# Patient Record
Sex: FEMALE | Race: WHITE | NOT HISPANIC OR LATINO | URBAN - METROPOLITAN AREA
[De-identification: names, ages, dates, MRNs, and addresses within clinical notes are randomized per-mention and may not be internally consistent; named-entity substitution may affect disease eponyms.]

---

## 2023-04-10 ENCOUNTER — NURSING HOME VISIT (OUTPATIENT)
Dept: POST ACUTE CARE | Facility: EXTERNAL LOCATION | Age: 88
End: 2023-04-10
Payer: MEDICARE

## 2023-04-10 DIAGNOSIS — E87.6 HYPOKALEMIA: ICD-10-CM

## 2023-04-10 DIAGNOSIS — J30.9 ALLERGIC RHINITIS, UNSPECIFIED SEASONALITY, UNSPECIFIED TRIGGER: ICD-10-CM

## 2023-04-10 DIAGNOSIS — L98.429 SKIN ULCER OF SACRUM, UNSPECIFIED ULCER STAGE (MULTI): ICD-10-CM

## 2023-04-10 DIAGNOSIS — F41.9 ANXIETY: ICD-10-CM

## 2023-04-10 DIAGNOSIS — F51.01 PRIMARY INSOMNIA: ICD-10-CM

## 2023-04-10 DIAGNOSIS — U07.1 COVID-19: Primary | ICD-10-CM

## 2023-04-10 DIAGNOSIS — I10 PRIMARY HYPERTENSION: ICD-10-CM

## 2023-04-10 DIAGNOSIS — D50.9 IRON DEFICIENCY ANEMIA, UNSPECIFIED IRON DEFICIENCY ANEMIA TYPE: ICD-10-CM

## 2023-04-10 DIAGNOSIS — R53.81 PHYSICAL DECONDITIONING: ICD-10-CM

## 2023-04-10 DIAGNOSIS — K21.9 GASTROESOPHAGEAL REFLUX DISEASE WITHOUT ESOPHAGITIS: ICD-10-CM

## 2023-04-10 PROCEDURE — 99309 SBSQ NF CARE MODERATE MDM 30: CPT | Performed by: NURSE PRACTITIONER

## 2023-04-10 NOTE — LETTER
Patient: Dixie Hansen  : 1935    Encounter Date: 04/10/2023    Patient ID: Dixie Hansen is a 88 y.o. female who is acute skilled care being seen and evaluated for multiple medical problems.  [id number]   1935    /80   Pulse 86   Temp 36.7 °C (98 °F)   Resp 17   Wt 73 kg (161 lb)   SpO2 97%     Assessment/Plan  Problem List Items Addressed This Visit          Nervous    Primary insomnia     Melatonin 5 mg by mouth every HS as needed            Circulatory    Primary hypertension     Atenolol 25 mg by mouth daily             Digestive    Gastroesophageal reflux disease without esophagitis     Pepcid 20 mg by mouth daily             Hematologic    Iron deficiency anemia     Ferrous sulfate 325 mg by mouth daily   Folic acid 1 mg by mouth daily (Anemia)             Infectious/Inflammatory    COVID-19 - Primary     Afebrile  Denies sore throat, cough, or ear pain. C/O post nasal drip            Other    Anxiety     Add Vistaril 25 mg by mouth every 6 hrs as needed          Hypokalemia     Potassium 2023- 3.5  Potasssium 10 meq by mouth daily          Allergic rhinitis     Loratadine 10 mg by mouth daily          Skin ulcer of sacrum (CMS/HCC)     Doxycycline 100 mg by mouth BID (Stop date 2023)  Augmentin 500/125 mg by mouth BID (Stop date 2023)   Add Probiotic two capsules by mouth daily x 3 weeks  Wound care as ordered   Acetaminophen 650 mg by mouth every 4 hrs as needed    Tramadol 50 mg by mouth every 8 hrs as needd         Physical deconditioning     PT/OT              HPI: Client was admitted at [facility] from 3/28/2023- 2023. Admitted with visual hallucinations and change in mentation. Final diagnosis of infected sacral decubitus and COVID Positive. Dr Salgado consulted (ID) and client received Vancomycin and Zosyn. BC positive for coag- negative staph, thought to be contaminated. Wound culture positive for MRSA, Ecoli, and Proteus. Client discharged with oral Augmentin  and Doxycycline. PMH includes psoriatic arthritis (Dr Mcintyre),  HTN, HLD, and a carotid endarterectomy at [place] in 2003. Client denies HA, dizziness, or lightheadedness. Denies CP, SOB, Cough, or increased edema. RA. Denies abdominal pain, N/V,  or constipation. C/O loose stools. Denies dysuria. States appetite fair. Denies insomnia. C/O lumbar pain.     Review of Systems: ROS as described in in HPI     Physical Exam  Constitutional:       Appearance: Normal appearance.      Comments: Resting in bed   HENT:      Head: Normocephalic.      Mouth/Throat:      Mouth: Mucous membranes are moist.   Cardiovascular:      Rate and Rhythm: Normal rate.   Pulmonary:      Effort: Pulmonary effort is normal.      Breath sounds: Normal breath sounds.      Comments: RA  Abdominal:      General: Bowel sounds are normal.   Genitourinary:     Comments: Denies dysuria   Musculoskeletal:      Cervical back: Neck supple.      Comments: Physical deconditioning  PT/OT   Skin:     General: Skin is warm and dry.      Comments: Sacral decubitus  Prafo boots     Neurological:      Mental Status: She is alert. Mental status is at baseline.   Psychiatric:         Mood and Affect: Mood normal.      Comments: Staff report client has episodes of heightened anxiety                    Electronically Signed By: SORAYA Barraza-CNP   4/11/23  4:14 PM

## 2023-04-11 VITALS
DIASTOLIC BLOOD PRESSURE: 80 MMHG | HEART RATE: 86 BPM | RESPIRATION RATE: 17 BRPM | OXYGEN SATURATION: 97 % | WEIGHT: 161 LBS | SYSTOLIC BLOOD PRESSURE: 128 MMHG | TEMPERATURE: 98 F

## 2023-04-11 PROBLEM — E87.6 HYPOKALEMIA: Status: ACTIVE | Noted: 2023-04-11

## 2023-04-11 PROBLEM — J30.9 ALLERGIC RHINITIS: Status: ACTIVE | Noted: 2023-04-11

## 2023-04-11 PROBLEM — I10 PRIMARY HYPERTENSION: Status: ACTIVE | Noted: 2023-04-11

## 2023-04-11 PROBLEM — F51.01 PRIMARY INSOMNIA: Status: ACTIVE | Noted: 2023-04-11

## 2023-04-11 PROBLEM — R53.81 PHYSICAL DECONDITIONING: Status: ACTIVE | Noted: 2023-04-11

## 2023-04-11 PROBLEM — F41.9 ANXIETY: Status: ACTIVE | Noted: 2023-04-11

## 2023-04-11 PROBLEM — K21.9 GASTROESOPHAGEAL REFLUX DISEASE WITHOUT ESOPHAGITIS: Status: ACTIVE | Noted: 2023-04-11

## 2023-04-11 PROBLEM — L98.429 SKIN ULCER OF SACRUM (MULTI): Status: ACTIVE | Noted: 2023-04-11

## 2023-04-11 PROBLEM — U07.1 COVID-19: Status: ACTIVE | Noted: 2023-04-11

## 2023-04-11 PROBLEM — D50.9 IRON DEFICIENCY ANEMIA: Status: ACTIVE | Noted: 2023-04-11

## 2023-04-11 NOTE — PROGRESS NOTES
Patient ID: Dixie Hansen is a 88 y.o. female who is acute skilled care being seen and evaluated for multiple medical problems.  [id number]   1935    /80   Pulse 86   Temp 36.7 °C (98 °F)   Resp 17   Wt 73 kg (161 lb)   SpO2 97%     Assessment/Plan  Problem List Items Addressed This Visit          Nervous    Primary insomnia     Melatonin 5 mg by mouth every HS as needed            Circulatory    Primary hypertension     Atenolol 25 mg by mouth daily             Digestive    Gastroesophageal reflux disease without esophagitis     Pepcid 20 mg by mouth daily             Hematologic    Iron deficiency anemia     Ferrous sulfate 325 mg by mouth daily   Folic acid 1 mg by mouth daily (Anemia)             Infectious/Inflammatory    COVID-19 - Primary     Afebrile  Denies sore throat, cough, or ear pain. C/O post nasal drip            Other    Anxiety     Add Vistaril 25 mg by mouth every 6 hrs as needed          Hypokalemia     Potassium 4/6/2023- 3.5  Potasssium 10 meq by mouth daily          Allergic rhinitis     Loratadine 10 mg by mouth daily          Skin ulcer of sacrum (CMS/HCC)     Doxycycline 100 mg by mouth BID (Stop date 4/14/2023)  Augmentin 500/125 mg by mouth BID (Stop date 4/14/2023)   Add Probiotic two capsules by mouth daily x 3 weeks  Wound care as ordered   Acetaminophen 650 mg by mouth every 4 hrs as needed    Tramadol 50 mg by mouth every 8 hrs as needd         Physical deconditioning     PT/OT              HPI: Client was admitted at [facility] from 3/28/2023- 4/4/2023. Admitted with visual hallucinations and change in mentation. Final diagnosis of infected sacral decubitus and COVID Positive. Dr Salgado consulted (ID) and client received Vancomycin and Zosyn. BC positive for coag- negative staph, thought to be contaminated. Wound culture positive for MRSA, Ecoli, and Proteus. Client discharged with oral Augmentin and Doxycycline. PMH includes psoriatic arthritis (Dr Mcintyre),  HTN,  HLAL, and a carotid endarterectomy at [place] in 2003. Client denies HA, dizziness, or lightheadedness. Denies CP, SOB, Cough, or increased edema. RA. Denies abdominal pain, N/V,  or constipation. C/O loose stools. Denies dysuria. States appetite fair. Denies insomnia. C/O lumbar pain.     Review of Systems: ROS as described in in HPI     Physical Exam  Constitutional:       Appearance: Normal appearance.      Comments: Resting in bed   HENT:      Head: Normocephalic.      Mouth/Throat:      Mouth: Mucous membranes are moist.   Cardiovascular:      Rate and Rhythm: Normal rate.   Pulmonary:      Effort: Pulmonary effort is normal.      Breath sounds: Normal breath sounds.      Comments: RA  Abdominal:      General: Bowel sounds are normal.   Genitourinary:     Comments: Denies dysuria   Musculoskeletal:      Cervical back: Neck supple.      Comments: Physical deconditioning  PT/OT   Skin:     General: Skin is warm and dry.      Comments: Sacral decubitus  Prafo boots     Neurological:      Mental Status: She is alert. Mental status is at baseline.   Psychiatric:         Mood and Affect: Mood normal.      Comments: Staff report client has episodes of heightened anxiety

## 2023-04-11 NOTE — ASSESSMENT & PLAN NOTE
Ferrous sulfate 325 mg by mouth daily   Folic acid 1 mg by mouth daily (Anemia)    All other review of systems negative, except as noted in HPI

## 2023-04-11 NOTE — ASSESSMENT & PLAN NOTE
Doxycycline 100 mg by mouth BID (Stop date 4/14/2023)  Augmentin 500/125 mg by mouth BID (Stop date 4/14/2023)   Add Probiotic two capsules by mouth daily x 3 weeks  Wound care as ordered   Acetaminophen 650 mg by mouth every 4 hrs as needed    Tramadol 50 mg by mouth every 8 hrs as needd

## 2023-04-17 ENCOUNTER — NURSING HOME VISIT (OUTPATIENT)
Dept: POST ACUTE CARE | Facility: EXTERNAL LOCATION | Age: 88
End: 2023-04-17
Payer: MEDICARE

## 2023-04-17 DIAGNOSIS — L98.429 SKIN ULCER OF SACRUM, UNSPECIFIED ULCER STAGE (MULTI): ICD-10-CM

## 2023-04-17 DIAGNOSIS — U07.1 COVID-19: ICD-10-CM

## 2023-04-17 DIAGNOSIS — I10 PRIMARY HYPERTENSION: ICD-10-CM

## 2023-04-17 DIAGNOSIS — R53.81 PHYSICAL DECONDITIONING: Primary | ICD-10-CM

## 2023-04-17 PROCEDURE — 99308 SBSQ NF CARE LOW MDM 20: CPT | Performed by: NURSE PRACTITIONER

## 2023-04-17 NOTE — LETTER
Patient: Dixie Hansen  : 1935    Encounter Date: 2023    Patient ID: Dixie Hansen is a 88 y.o. female who is acute skilled care being seen and evaluated for multiple medical problems.  [id number]   1935    /77   Pulse 76   Temp 36.1 °C (97 °F)   Resp 17   Wt 73 kg (161 lb)   SpO2 98%     Assessment/Plan  Problem List Items Addressed This Visit          Circulatory    Primary hypertension     Atenolol 25 mg by mouth daily              Infectious/Inflammatory    COVID-19     COVID isolation discontinued   Afebrile  Denies cough                Other    Skin ulcer of sacrum (CMS/HCC)     Doxycycline 100 mg by mouth BID (Completed 2023)  Augmentin 500/125 mg by mouth BID (Completed 2023)   Probiotic two capsules by mouth daily x 3 weeks  Wound care as ordered   Acetaminophen 650 mg by mouth every 4 hrs as needed    Tramadol 50 mg by mouth every 8 hrs as needd         Physical deconditioning - Primary     PT/OT              HPI: Client continues to receive PT/OT services, physical deconditioning. Client denies HA, dizziness, or lightheadedness. Denies CP, SOB, Cough, or increased edema. RA. Denies abdominal pain, N/V, diarrhea, or constipation. Denies dysuria (Pérez). Denies change in appetite. Denies insomnia. Denies current pain.      Review of Systems ROS as described in in HPI     Physical Exam  Constitutional:       Comments: Resting in bed    HENT:      Head: Normocephalic.      Mouth/Throat:      Mouth: Mucous membranes are moist.   Cardiovascular:      Rate and Rhythm: Normal rate.   Pulmonary:      Effort: Pulmonary effort is normal.      Breath sounds: Normal breath sounds.      Comments: RA  Abdominal:      General: Bowel sounds are normal.   Genitourinary:     Comments: Pérez with yellow urine   Musculoskeletal:      Cervical back: Neck supple.      Comments: Physical deconditioning  PT/OT  Bilateral heal protectors    Skin:     General: Skin is warm and dry.   Neurological:       Mental Status: She is alert. Mental status is at baseline.   Psychiatric:         Mood and Affect: Mood normal.      Comments: Cooperative with no current heightened anxiety                    Electronically Signed By: DESTINY Barraza   4/18/23  4:20 PM

## 2023-04-18 VITALS
RESPIRATION RATE: 17 BRPM | TEMPERATURE: 97 F | OXYGEN SATURATION: 98 % | WEIGHT: 161 LBS | DIASTOLIC BLOOD PRESSURE: 77 MMHG | SYSTOLIC BLOOD PRESSURE: 124 MMHG | HEART RATE: 76 BPM

## 2023-04-18 NOTE — PROGRESS NOTES
Patient ID: Dixie Hansen is a 88 y.o. female who is acute skilled care being seen and evaluated for multiple medical problems.  [id number]   1935    /77   Pulse 76   Temp 36.1 °C (97 °F)   Resp 17   Wt 73 kg (161 lb)   SpO2 98%     Assessment/Plan  Problem List Items Addressed This Visit          Circulatory    Primary hypertension     Atenolol 25 mg by mouth daily              Infectious/Inflammatory    COVID-19     COVID isolation discontinued   Afebrile  Denies cough                Other    Skin ulcer of sacrum (CMS/HCC)     Doxycycline 100 mg by mouth BID (Completed 4/14/2023)  Augmentin 500/125 mg by mouth BID (Completed 4/14/2023)   Probiotic two capsules by mouth daily x 3 weeks  Wound care as ordered   Acetaminophen 650 mg by mouth every 4 hrs as needed    Tramadol 50 mg by mouth every 8 hrs as needd         Physical deconditioning - Primary     PT/OT              HPI: Client continues to receive PT/OT services, physical deconditioning. Client denies HA, dizziness, or lightheadedness. Denies CP, SOB, Cough, or increased edema. RA. Denies abdominal pain, N/V, diarrhea, or constipation. Denies dysuria (Pérez). Denies change in appetite. Denies insomnia. Denies current pain.      Review of Systems ROS as described in in HPI     Physical Exam  Constitutional:       Comments: Resting in bed    HENT:      Head: Normocephalic.      Mouth/Throat:      Mouth: Mucous membranes are moist.   Cardiovascular:      Rate and Rhythm: Normal rate.   Pulmonary:      Effort: Pulmonary effort is normal.      Breath sounds: Normal breath sounds.      Comments: RA  Abdominal:      General: Bowel sounds are normal.   Genitourinary:     Comments: Pérez with yellow urine   Musculoskeletal:      Cervical back: Neck supple.      Comments: Physical deconditioning  PT/OT  Bilateral heal protectors    Skin:     General: Skin is warm and dry.   Neurological:      Mental Status: She is alert. Mental status is at baseline.    Psychiatric:         Mood and Affect: Mood normal.      Comments: Cooperative with no current heightened anxiety

## 2023-04-18 NOTE — ASSESSMENT & PLAN NOTE
Doxycycline 100 mg by mouth BID (Completed 4/14/2023)  Augmentin 500/125 mg by mouth BID (Completed 4/14/2023)   Probiotic two capsules by mouth daily x 3 weeks  Wound care as ordered   Acetaminophen 650 mg by mouth every 4 hrs as needed    Tramadol 50 mg by mouth every 8 hrs as needd

## 2023-05-02 ENCOUNTER — NURSING HOME VISIT (OUTPATIENT)
Dept: POST ACUTE CARE | Facility: EXTERNAL LOCATION | Age: 88
End: 2023-05-02
Payer: MEDICARE

## 2023-05-02 DIAGNOSIS — I10 PRIMARY HYPERTENSION: ICD-10-CM

## 2023-05-02 DIAGNOSIS — E87.6 HYPOKALEMIA: ICD-10-CM

## 2023-05-02 DIAGNOSIS — R53.81 PHYSICAL DECONDITIONING: Primary | ICD-10-CM

## 2023-05-02 DIAGNOSIS — L98.429 SKIN ULCER OF SACRUM, UNSPECIFIED ULCER STAGE (MULTI): ICD-10-CM

## 2023-05-02 DIAGNOSIS — I49.9 IRREGULAR HEART RATE: ICD-10-CM

## 2023-05-02 PROCEDURE — 99308 SBSQ NF CARE LOW MDM 20: CPT | Performed by: NURSE PRACTITIONER

## 2023-05-02 NOTE — LETTER
Patient: Dixie Hansen  : 1935    Encounter Date: 2023    Patient ID: Dixie Hansen is a 88 y.o. female who is acute skilled care being seen and evaluated for multiple medical problems.  [id number]   1935    /81   Pulse 78   Temp 36.7 °C (98 °F)   Resp 17   Wt 73 kg (161 lb)   SpO2 96%     Assessment/Plan  Problem List Items Addressed This Visit          Circulatory    Primary hypertension     Atenolol 25 mg by mouth daily           Irregular heart rate     EKG  BMP  CBC with diff  TSH            Other    Hypokalemia     Potassium 2023- 3.5  Potasssium 10 meq by mouth daily            Skin ulcer of sacrum (CMS/HCC)     Doxycycline 100 mg by mouth BID (Completed 2023)  Augmentin 500/125 mg by mouth BID (Completed 2023)   Wound care as ordered   Acetaminophen 650 mg by mouth every 4 hrs as needed    Tramadol 50 mg by mouth every 8 hrs as needed         Physical deconditioning - Primary     PT/OT              HPI: Client continues to receive PT services, physical deconditioning. Client denies HA, dizziness, or lightheadedness. Denies CP, SOB, Cough, or increased edema. RA. Denies abdominal pain, N/V, or constipation. States she had 3 episodes of diarrhea yesterday. Denies dysuria. States appetite decreased today. Denies insomnia. C/O buttock pain.      Review of Systems ROS as described in in HPI     Physical Exam  Constitutional:       Appearance: Normal appearance.      Comments: Sitting in WC   HENT:      Head: Normocephalic.      Mouth/Throat:      Mouth: Mucous membranes are moist.   Cardiovascular:      Rate and Rhythm: Normal rate. Rhythm irregular.   Pulmonary:      Effort: Pulmonary effort is normal.      Breath sounds: Normal breath sounds.      Comments: RA  Abdominal:      General: Bowel sounds are normal.   Genitourinary:     Comments: Denies dysuria   Musculoskeletal:      Cervical back: Neck supple.      Comments: PT/OT  Physical deconditioning    Skin:     General:  Skin is warm and dry.      Comments: Sacral wound care as ordered   Neurological:      Mental Status: She is alert. Mental status is at baseline.   Psychiatric:         Mood and Affect: Mood normal.      Comments: Staff report client has episodes of heightened anxiety                    Electronically Signed By: DESTINY Barraza   5/3/23  8:36 AM

## 2023-05-03 VITALS
TEMPERATURE: 98 F | HEART RATE: 78 BPM | RESPIRATION RATE: 17 BRPM | WEIGHT: 161 LBS | SYSTOLIC BLOOD PRESSURE: 142 MMHG | DIASTOLIC BLOOD PRESSURE: 81 MMHG | OXYGEN SATURATION: 96 %

## 2023-05-03 PROBLEM — I49.9 IRREGULAR HEART RATE: Status: ACTIVE | Noted: 2023-05-03

## 2023-05-03 NOTE — PROGRESS NOTES
Patient ID: Dixie Hansen is a 88 y.o. female who is acute skilled care being seen and evaluated for multiple medical problems.  [id number]   1935    /81   Pulse 78   Temp 36.7 °C (98 °F)   Resp 17   Wt 73 kg (161 lb)   SpO2 96%     Assessment/Plan  Problem List Items Addressed This Visit          Circulatory    Primary hypertension     Atenolol 25 mg by mouth daily           Irregular heart rate     EKG  BMP  CBC with diff  TSH            Other    Hypokalemia     Potassium 4/6/2023- 3.5  Potasssium 10 meq by mouth daily            Skin ulcer of sacrum (CMS/HCC)     Doxycycline 100 mg by mouth BID (Completed 4/14/2023)  Augmentin 500/125 mg by mouth BID (Completed 4/14/2023)   Wound care as ordered   Acetaminophen 650 mg by mouth every 4 hrs as needed    Tramadol 50 mg by mouth every 8 hrs as needed         Physical deconditioning - Primary     PT/OT              HPI: Client continues to receive PT services, physical deconditioning. Client denies HA, dizziness, or lightheadedness. Denies CP, SOB, Cough, or increased edema. RA. Denies abdominal pain, N/V, or constipation. States she had 3 episodes of diarrhea yesterday. Denies dysuria. States appetite decreased today. Denies insomnia. C/O buttock pain.      Review of Systems ROS as described in in HPI     Physical Exam  Constitutional:       Appearance: Normal appearance.      Comments: Sitting in WC   HENT:      Head: Normocephalic.      Mouth/Throat:      Mouth: Mucous membranes are moist.   Cardiovascular:      Rate and Rhythm: Normal rate. Rhythm irregular.   Pulmonary:      Effort: Pulmonary effort is normal.      Breath sounds: Normal breath sounds.      Comments: RA  Abdominal:      General: Bowel sounds are normal.   Genitourinary:     Comments: Denies dysuria   Musculoskeletal:      Cervical back: Neck supple.      Comments: PT/OT  Physical deconditioning    Skin:     General: Skin is warm and dry.      Comments: Sacral wound care as ordered    Neurological:      Mental Status: She is alert. Mental status is at baseline.   Psychiatric:         Mood and Affect: Mood normal.      Comments: Staff report client has episodes of heightened anxiety

## 2023-05-03 NOTE — ASSESSMENT & PLAN NOTE
Doxycycline 100 mg by mouth BID (Completed 4/14/2023)  Augmentin 500/125 mg by mouth BID (Completed 4/14/2023)   Wound care as ordered   Acetaminophen 650 mg by mouth every 4 hrs as needed    Tramadol 50 mg by mouth every 8 hrs as needed

## 2023-05-09 ENCOUNTER — NURSING HOME VISIT (OUTPATIENT)
Dept: POST ACUTE CARE | Facility: EXTERNAL LOCATION | Age: 88
End: 2023-05-09
Payer: MEDICARE

## 2023-05-09 DIAGNOSIS — F41.9 ANXIETY: ICD-10-CM

## 2023-05-09 DIAGNOSIS — I49.9 IRREGULAR HEART RATE: ICD-10-CM

## 2023-05-09 DIAGNOSIS — R05.1 ACUTE COUGH: Primary | ICD-10-CM

## 2023-05-09 DIAGNOSIS — E87.6 HYPOKALEMIA: ICD-10-CM

## 2023-05-09 DIAGNOSIS — I10 PRIMARY HYPERTENSION: ICD-10-CM

## 2023-05-09 DIAGNOSIS — R53.81 PHYSICAL DECONDITIONING: ICD-10-CM

## 2023-05-09 DIAGNOSIS — E03.9 HYPOTHYROIDISM, UNSPECIFIED TYPE: ICD-10-CM

## 2023-05-09 PROCEDURE — 99308 SBSQ NF CARE LOW MDM 20: CPT | Performed by: NURSE PRACTITIONER

## 2023-05-09 NOTE — LETTER
Patient: Dixie Hansen  : 1935    Encounter Date: 2023    Patient ID: Dixie Hansen is a 88 y.o. female who is acute skilled care being seen and evaluated for multiple medical problems.  [id number]   1935    /72   Pulse 78   Temp 36.7 °C (98 °F)   Resp 16   Wt 73 kg (161 lb)   SpO2 96%     Assessment/Plan  Problem List Items Addressed This Visit          Circulatory    Primary hypertension     Atenolol 25 mg by mouth daily            Irregular heart rate     EKG - Wondering atrial pacemaker   Schedule an apt with Cardiology               Endocrine/Metabolic    Hypothyroidism     2023 TSH 5.2  Levothyroxine 25 mcg by mouth daily   Repeat TSH in 4 weeks             Other    Anxiety     Vistaril 25 mg by mouth every 6 hrs as needed           Hypokalemia     Potassium 5/3/2023- 3.2  Potasssium 20 meq by mouth daily   Repeat BMP 5/10/2023          Physical deconditioning     PT         Acute cough - Primary     2023 CXR (-) for acute finding   Staff to report fever/chills              HPI: Client continues to receive PT services. Staff reported client C/O cough. CXR 2023 (-) for acute finding. Client denies HA, dizziness, or lightheadedness. Denies CP, SOB, or increased edema. C/O cough- nonproductive. Denies abdominal pain, N/V, diarrhea, or constipation. Denies dysuria. C/O lumbar pain.     Review of Systems ROS as described in in HPI     Physical Exam  Constitutional:       Comments: Sleeping on arrival  Easily awakens    HENT:      Head: Normocephalic.      Mouth/Throat:      Mouth: Mucous membranes are moist.   Cardiovascular:      Rate and Rhythm: Normal rate. Rhythm irregular.      Comments: EKG- Wondering atrial pacemaker   Pulmonary:      Effort: Pulmonary effort is normal.      Breath sounds: Normal breath sounds.      Comments: C/O nonproductive cough   Abdominal:      General: Bowel sounds are normal.   Genitourinary:     Comments: Denies dysuria   Musculoskeletal:       Cervical back: Neck supple.      Comments: Physical deconditioning   PT   Skin:     General: Skin is warm and dry.      Comments: Dressing D/I to right foot  Heal protectors    Neurological:      Mental Status: She is alert. Mental status is at baseline.   Psychiatric:         Mood and Affect: Mood normal.      Comments: Staff report client has episodes of heightened anxiety                    Electronically Signed By: DESTINY Barraza   5/10/23 11:13 AM

## 2023-05-10 VITALS
DIASTOLIC BLOOD PRESSURE: 72 MMHG | TEMPERATURE: 98 F | RESPIRATION RATE: 16 BRPM | OXYGEN SATURATION: 96 % | SYSTOLIC BLOOD PRESSURE: 136 MMHG | HEART RATE: 78 BPM | WEIGHT: 161 LBS

## 2023-05-10 PROBLEM — R05.1 ACUTE COUGH: Status: ACTIVE | Noted: 2023-05-10

## 2023-05-10 PROBLEM — E03.9 HYPOTHYROIDISM: Status: ACTIVE | Noted: 2023-05-10

## 2023-05-10 NOTE — PROGRESS NOTES
Patient ID: Dixie Hansen is a 88 y.o. female who is acute skilled care being seen and evaluated for multiple medical problems.  [id number]   1935    /72   Pulse 78   Temp 36.7 °C (98 °F)   Resp 16   Wt 73 kg (161 lb)   SpO2 96%     Assessment/Plan  Problem List Items Addressed This Visit          Circulatory    Primary hypertension     Atenolol 25 mg by mouth daily            Irregular heart rate     EKG - Wondering atrial pacemaker   Schedule an apt with Cardiology               Endocrine/Metabolic    Hypothyroidism     5/8/2023 TSH 5.2  Levothyroxine 25 mcg by mouth daily   Repeat TSH in 4 weeks             Other    Anxiety     Vistaril 25 mg by mouth every 6 hrs as needed           Hypokalemia     Potassium 5/3/2023- 3.2  Potasssium 20 meq by mouth daily   Repeat BMP 5/10/2023          Physical deconditioning     PT         Acute cough - Primary     5/8/2023 CXR (-) for acute finding   Staff to report fever/chills              HPI: Client continues to receive PT services. Staff reported client C/O cough. CXR 5/8/2023 (-) for acute finding. Client denies HA, dizziness, or lightheadedness. Denies CP, SOB, or increased edema. C/O cough- nonproductive. Denies abdominal pain, N/V, diarrhea, or constipation. Denies dysuria. C/O lumbar pain.     Review of Systems ROS as described in in HPI     Physical Exam  Constitutional:       Comments: Sleeping on arrival  Easily awakens    HENT:      Head: Normocephalic.      Mouth/Throat:      Mouth: Mucous membranes are moist.   Cardiovascular:      Rate and Rhythm: Normal rate. Rhythm irregular.      Comments: EKG- Wondering atrial pacemaker   Pulmonary:      Effort: Pulmonary effort is normal.      Breath sounds: Normal breath sounds.      Comments: C/O nonproductive cough   Abdominal:      General: Bowel sounds are normal.   Genitourinary:     Comments: Denies dysuria   Musculoskeletal:      Cervical back: Neck supple.      Comments: Physical deconditioning   PT    Skin:     General: Skin is warm and dry.      Comments: Dressing D/I to right foot  Heal protectors    Neurological:      Mental Status: She is alert. Mental status is at baseline.   Psychiatric:         Mood and Affect: Mood normal.      Comments: Staff report client has episodes of heightened anxiety

## 2023-05-16 ENCOUNTER — NURSING HOME VISIT (OUTPATIENT)
Dept: POST ACUTE CARE | Facility: EXTERNAL LOCATION | Age: 88
End: 2023-05-16
Payer: MEDICARE

## 2023-05-16 DIAGNOSIS — R53.81 PHYSICAL DECONDITIONING: Primary | ICD-10-CM

## 2023-05-16 DIAGNOSIS — E87.6 HYPOKALEMIA: ICD-10-CM

## 2023-05-16 DIAGNOSIS — F32.1 CURRENT MODERATE EPISODE OF MAJOR DEPRESSIVE DISORDER, UNSPECIFIED WHETHER RECURRENT (MULTI): ICD-10-CM

## 2023-05-16 DIAGNOSIS — E03.9 HYPOTHYROIDISM, UNSPECIFIED TYPE: ICD-10-CM

## 2023-05-16 PROCEDURE — 99308 SBSQ NF CARE LOW MDM 20: CPT | Performed by: NURSE PRACTITIONER

## 2023-05-16 NOTE — LETTER
Patient: Dixie Hansen  : 1935    Encounter Date: 2023    Patient ID: Dixie Hansen is a 88 y.o. female who is acute skilled care being seen and evaluated for multiple medical problems.  [id number]   1935    /64   Pulse 70   Temp 36.1 °C (97 °F)   Resp 16   Wt 73 kg (161 lb)   SpO2 98%     Assessment/Plan  Problem List Items Addressed This Visit          Endocrine/Metabolic    Hypothyroidism     2023 TSH 5.2  Levothyroxine 25 mcg by mouth daily   Repeat TSH 2023            Other    Hypokalemia     Potassium 5/3/2023- 3.2  Potasssium 20 meq by mouth daily   Repeat BMP 5/10/2023 - 4.1         Physical deconditioning - Primary     PT         Current moderate episode of major depressive disorder (CMS/HCC)     Venlafaxine 37.5 mg by mouth daily               HPI: Client denies HA, dizziness, or lightheadedness. Denies CP, SOB,  or increased edema. RA. Denies abdominal pain, N/V, diarrhea, or constipation. Denies dysuria.  Denies change in appetite. C/O lumbar pain.     Review of Systems ROS as described in in HPI     Physical Exam  Constitutional:       Appearance: Normal appearance.      Comments: Resting in bed    HENT:      Head: Normocephalic.      Mouth/Throat:      Mouth: Mucous membranes are moist.   Cardiovascular:      Rate and Rhythm: Normal rate.      Comments: Occasional premature beat   Pulmonary:      Effort: Pulmonary effort is normal.      Breath sounds: Normal breath sounds.   Abdominal:      General: Bowel sounds are normal.   Genitourinary:     Comments: Denies dysuria   Musculoskeletal:      Cervical back: Neck supple.      Comments: PT  Heal protectors intact    Skin:     General: Skin is warm and dry.   Neurological:      Mental Status: She is alert. Mental status is at baseline.   Psychiatric:         Mood and Affect: Mood normal.      Comments: No current heightened anxiety                    Electronically Signed By: DESTINY Barraza   23 10:56 AM

## 2023-05-17 VITALS
TEMPERATURE: 97 F | HEART RATE: 70 BPM | DIASTOLIC BLOOD PRESSURE: 64 MMHG | WEIGHT: 161 LBS | OXYGEN SATURATION: 98 % | RESPIRATION RATE: 16 BRPM | SYSTOLIC BLOOD PRESSURE: 120 MMHG

## 2023-05-17 PROBLEM — F32.1 CURRENT MODERATE EPISODE OF MAJOR DEPRESSIVE DISORDER (MULTI): Status: ACTIVE | Noted: 2023-05-17

## 2023-05-17 NOTE — PROGRESS NOTES
Patient ID: Dixie Hansen is a 88 y.o. female who is acute skilled care being seen and evaluated for multiple medical problems.  [id number]   1935    /64   Pulse 70   Temp 36.1 °C (97 °F)   Resp 16   Wt 73 kg (161 lb)   SpO2 98%     Assessment/Plan  Problem List Items Addressed This Visit          Endocrine/Metabolic    Hypothyroidism     5/8/2023 TSH 5.2  Levothyroxine 25 mcg by mouth daily   Repeat TSH 5/31/2023            Other    Hypokalemia     Potassium 5/3/2023- 3.2  Potasssium 20 meq by mouth daily   Repeat BMP 5/10/2023 - 4.1         Physical deconditioning - Primary     PT         Current moderate episode of major depressive disorder (CMS/HCC)     Venlafaxine 37.5 mg by mouth daily               HPI: Client denies HA, dizziness, or lightheadedness. Denies CP, SOB,  or increased edema. RA. Denies abdominal pain, N/V, diarrhea, or constipation. Denies dysuria.  Denies change in appetite. C/O lumbar pain.     Review of Systems ROS as described in in HPI     Physical Exam  Constitutional:       Appearance: Normal appearance.      Comments: Resting in bed    HENT:      Head: Normocephalic.      Mouth/Throat:      Mouth: Mucous membranes are moist.   Cardiovascular:      Rate and Rhythm: Normal rate.      Comments: Occasional premature beat   Pulmonary:      Effort: Pulmonary effort is normal.      Breath sounds: Normal breath sounds.   Abdominal:      General: Bowel sounds are normal.   Genitourinary:     Comments: Denies dysuria   Musculoskeletal:      Cervical back: Neck supple.      Comments: PT  Heal protectors intact    Skin:     General: Skin is warm and dry.   Neurological:      Mental Status: She is alert. Mental status is at baseline.   Psychiatric:         Mood and Affect: Mood normal.      Comments: No current heightened anxiety

## 2023-05-23 ENCOUNTER — NURSING HOME VISIT (OUTPATIENT)
Dept: POST ACUTE CARE | Facility: EXTERNAL LOCATION | Age: 88
End: 2023-05-23
Payer: MEDICARE

## 2023-05-23 DIAGNOSIS — F32.1 CURRENT MODERATE EPISODE OF MAJOR DEPRESSIVE DISORDER, UNSPECIFIED WHETHER RECURRENT (MULTI): Primary | ICD-10-CM

## 2023-05-23 DIAGNOSIS — R63.4 WEIGHT LOSS: ICD-10-CM

## 2023-05-23 DIAGNOSIS — K21.9 GASTROESOPHAGEAL REFLUX DISEASE WITHOUT ESOPHAGITIS: ICD-10-CM

## 2023-05-23 DIAGNOSIS — E87.6 HYPOKALEMIA: ICD-10-CM

## 2023-05-23 DIAGNOSIS — R53.81 PHYSICAL DECONDITIONING: ICD-10-CM

## 2023-05-23 PROCEDURE — 99308 SBSQ NF CARE LOW MDM 20: CPT | Performed by: NURSE PRACTITIONER

## 2023-05-23 NOTE — LETTER
Patient: Dixie Hansen  : 1935    Encounter Date: 2023    Patient ID: Dixie Hansen is a 88 y.o. female who is acute skilled care being seen and evaluated for multiple medical problems.  [id number]   1935    /78   Pulse 82   Temp 36.7 °C (98 °F)   Resp 17   Wt 63.5 kg (140 lb)   SpO2 98%     Assessment/Plan  Problem List Items Addressed This Visit          Digestive    Gastroesophageal reflux disease without esophagitis     Pepcid 20 mg by mouth every HS             Endocrine/Metabolic    Weight loss     BMP and CBC with diff 2023  Food snacks  House supplement             Other    Hypokalemia     Potassium 5/3/2023- 3.2  Potasssium 20 meq by mouth daily          Physical deconditioning     PT         Current moderate episode of major depressive disorder (CMS/HCC) - Primary     Venlafaxine 37.5 mg by mouth daily                HPI: Client continues to receive PT and wound care to sacrum. Client denies HA, dizziness, or lightheadedness. Denies CP, SOB, Cough, or increased edema. RA. Denies abdominal pain, diarrhea, or constipation. C/O waves of nausea. Denies dysuria (Pérez). Staff report decrease in appetite and client has weight loss. C/O sacral pain.       Review of Systems ROS as described in in HPI     Physical Exam  Constitutional:       Appearance: Normal appearance.      Comments: Resting in bed   HENT:      Head: Normocephalic.      Mouth/Throat:      Mouth: Mucous membranes are moist.   Cardiovascular:      Rate and Rhythm: Normal rate. Rhythm irregular.   Pulmonary:      Effort: Pulmonary effort is normal.      Breath sounds: Normal breath sounds.      Comments: RA  Abdominal:      General: Bowel sounds are normal.   Genitourinary:     Comments: Pérez with concentrated yellow urine   Musculoskeletal:      Cervical back: Neck supple.      Comments: PT   Skin:     General: Skin is warm and dry.      Comments: Bilateral heel protectors    Neurological:      Mental Status: She is  alert. Mental status is at baseline.   Psychiatric:         Mood and Affect: Mood normal.      Comments: No current heightened anxiety                    Electronically Signed By: DESTINY Barraza   5/25/23  6:35 AM

## 2023-05-25 VITALS
TEMPERATURE: 98 F | RESPIRATION RATE: 17 BRPM | DIASTOLIC BLOOD PRESSURE: 78 MMHG | SYSTOLIC BLOOD PRESSURE: 138 MMHG | WEIGHT: 140 LBS | OXYGEN SATURATION: 98 % | HEART RATE: 82 BPM

## 2023-05-25 PROBLEM — R63.4 WEIGHT LOSS: Status: ACTIVE | Noted: 2023-05-25

## 2023-05-25 NOTE — PROGRESS NOTES
Patient ID: Dixie Hansen is a 88 y.o. female who is acute skilled care being seen and evaluated for multiple medical problems.  [id number]   1935    /78   Pulse 82   Temp 36.7 °C (98 °F)   Resp 17   Wt 63.5 kg (140 lb)   SpO2 98%     Assessment/Plan  Problem List Items Addressed This Visit          Digestive    Gastroesophageal reflux disease without esophagitis     Pepcid 20 mg by mouth every HS             Endocrine/Metabolic    Weight loss     BMP and CBC with diff 5/31/2023  Food snacks  House supplement             Other    Hypokalemia     Potassium 5/3/2023- 3.2  Potasssium 20 meq by mouth daily          Physical deconditioning     PT         Current moderate episode of major depressive disorder (CMS/HCC) - Primary     Venlafaxine 37.5 mg by mouth daily                HPI: Client continues to receive PT and wound care to sacrum. Client denies HA, dizziness, or lightheadedness. Denies CP, SOB, Cough, or increased edema. RA. Denies abdominal pain, diarrhea, or constipation. C/O waves of nausea. Denies dysuria (Pérez). Staff report decrease in appetite and client has weight loss. C/O sacral pain.       Review of Systems ROS as described in in HPI     Physical Exam  Constitutional:       Appearance: Normal appearance.      Comments: Resting in bed   HENT:      Head: Normocephalic.      Mouth/Throat:      Mouth: Mucous membranes are moist.   Cardiovascular:      Rate and Rhythm: Normal rate. Rhythm irregular.   Pulmonary:      Effort: Pulmonary effort is normal.      Breath sounds: Normal breath sounds.      Comments: RA  Abdominal:      General: Bowel sounds are normal.   Genitourinary:     Comments: Pérez with concentrated yellow urine   Musculoskeletal:      Cervical back: Neck supple.      Comments: PT   Skin:     General: Skin is warm and dry.      Comments: Bilateral heel protectors    Neurological:      Mental Status: She is alert. Mental status is at baseline.   Psychiatric:         Mood and  Affect: Mood normal.      Comments: No current heightened anxiety

## 2023-05-30 ENCOUNTER — NURSING HOME VISIT (OUTPATIENT)
Dept: POST ACUTE CARE | Facility: EXTERNAL LOCATION | Age: 88
End: 2023-05-30
Payer: MEDICARE

## 2023-05-30 DIAGNOSIS — K21.9 GASTROESOPHAGEAL REFLUX DISEASE WITHOUT ESOPHAGITIS: ICD-10-CM

## 2023-05-30 DIAGNOSIS — F32.1 CURRENT MODERATE EPISODE OF MAJOR DEPRESSIVE DISORDER, UNSPECIFIED WHETHER RECURRENT (MULTI): Primary | ICD-10-CM

## 2023-05-30 DIAGNOSIS — I49.9 IRREGULAR HEART RATE: ICD-10-CM

## 2023-05-30 DIAGNOSIS — I10 PRIMARY HYPERTENSION: ICD-10-CM

## 2023-05-30 PROCEDURE — 99308 SBSQ NF CARE LOW MDM 20: CPT | Performed by: NURSE PRACTITIONER

## 2023-05-30 NOTE — LETTER
Patient: Dixie Hansen  : 1935    Encounter Date: 2023    Patient ID: Dixie Hnasen is a 88 y.o. female who is long term resident being seen and evaluated for multiple medical problems.  [id number]   1935    /76   Pulse 82   Temp 36.1 °C (97 °F)   Resp 17   Wt 63 kg (139 lb)   SpO2 97%     Assessment/Plan  Problem List Items Addressed This Visit          Circulatory    Primary hypertension     Atenolol 25 mg by mouth daily          Irregular heart rate     EKG - Wondering atrial pacemaker   Apt with Cardiology pending  ASA 81 mg by mouth daily             Digestive    Gastroesophageal reflux disease without esophagitis     Pepcid 20 mg by mouth every HS              Other    Current moderate episode of major depressive disorder (CMS/HCC) - Primary     Venlafaxine 37.5 mg by mouth daily                 HPI: Client continues to receive wound care to her sacral area. Client denies HA, dizziness, or lightheadedness. Denies CP, SOB, Cough, or increased edema. RA. Denies abdominal pain, N/V, diarrhea, or constipation. Denies dysuria (Pérez). States appetite fair. C/O pain to buttock.     Review of Systems ROS as described in in HPI     Physical Exam  Constitutional:       Appearance: Normal appearance.      Comments: Resting in bed    HENT:      Head: Normocephalic.      Mouth/Throat:      Mouth: Mucous membranes are moist.   Cardiovascular:      Rate and Rhythm: Normal rate. Rhythm irregular.   Pulmonary:      Effort: Pulmonary effort is normal.      Breath sounds: Normal breath sounds.      Comments: RA  Abdominal:      General: Bowel sounds are normal.   Genitourinary:     Comments: Pérez with concentrated yellow urine   Musculoskeletal:      Cervical back: Neck supple.      Comments: Nonambulatory   Bilateral heel protectors    Skin:     General: Skin is warm and dry.   Neurological:      Mental Status: She is alert. Mental status is at baseline.   Psychiatric:         Mood and Affect: Mood  normal.      Comments: No current heightened anxiety                    Electronically Signed By: DESTINY Barraza   5/31/23  6:28 PM

## 2023-05-31 VITALS
OXYGEN SATURATION: 97 % | RESPIRATION RATE: 17 BRPM | TEMPERATURE: 97 F | WEIGHT: 139 LBS | DIASTOLIC BLOOD PRESSURE: 76 MMHG | SYSTOLIC BLOOD PRESSURE: 126 MMHG | HEART RATE: 82 BPM

## 2023-05-31 NOTE — PROGRESS NOTES
Patient ID: Dixie Hansen is a 88 y.o. female who is long term resident being seen and evaluated for multiple medical problems.  [id number]   1935    /76   Pulse 82   Temp 36.1 °C (97 °F)   Resp 17   Wt 63 kg (139 lb)   SpO2 97%     Assessment/Plan  Problem List Items Addressed This Visit          Circulatory    Primary hypertension     Atenolol 25 mg by mouth daily          Irregular heart rate     EKG - Wondering atrial pacemaker   Apt with Cardiology pending  ASA 81 mg by mouth daily             Digestive    Gastroesophageal reflux disease without esophagitis     Pepcid 20 mg by mouth every HS              Other    Current moderate episode of major depressive disorder (CMS/HCC) - Primary     Venlafaxine 37.5 mg by mouth daily                 HPI: Client continues to receive wound care to her sacral area. Client denies HA, dizziness, or lightheadedness. Denies CP, SOB, Cough, or increased edema. RA. Denies abdominal pain, N/V, diarrhea, or constipation. Denies dysuria (Pérez). States appetite fair. C/O pain to buttock.     Review of Systems ROS as described in in HPI     Physical Exam  Constitutional:       Appearance: Normal appearance.      Comments: Resting in bed    HENT:      Head: Normocephalic.      Mouth/Throat:      Mouth: Mucous membranes are moist.   Cardiovascular:      Rate and Rhythm: Normal rate. Rhythm irregular.   Pulmonary:      Effort: Pulmonary effort is normal.      Breath sounds: Normal breath sounds.      Comments: RA  Abdominal:      General: Bowel sounds are normal.   Genitourinary:     Comments: Pérez with concentrated yellow urine   Musculoskeletal:      Cervical back: Neck supple.      Comments: Nonambulatory   Bilateral heel protectors    Skin:     General: Skin is warm and dry.   Neurological:      Mental Status: She is alert. Mental status is at baseline.   Psychiatric:         Mood and Affect: Mood normal.      Comments: No current heightened anxiety

## 2023-06-13 ENCOUNTER — NURSING HOME VISIT (OUTPATIENT)
Dept: POST ACUTE CARE | Facility: EXTERNAL LOCATION | Age: 88
End: 2023-06-13
Payer: MEDICARE

## 2023-06-13 DIAGNOSIS — E03.9 HYPOTHYROIDISM, UNSPECIFIED TYPE: ICD-10-CM

## 2023-06-13 DIAGNOSIS — I49.9 IRREGULAR HEART RATE: ICD-10-CM

## 2023-06-13 DIAGNOSIS — L98.429 SKIN ULCER OF SACRUM, UNSPECIFIED ULCER STAGE (MULTI): Primary | ICD-10-CM

## 2023-06-13 PROCEDURE — 99308 SBSQ NF CARE LOW MDM 20: CPT | Performed by: NURSE PRACTITIONER

## 2023-06-13 NOTE — LETTER
Patient: Dixie Hansen  : 1935    Encounter Date: 2023    Patient ID: Dixie Hansen is a 88 y.o. female who is long term resident being seen and evaluated for multiple medical problems.  [id number]   1935    /87   Pulse 89   Temp 36.1 °C (97 °F)   Resp 15   Wt 62.6 kg (138 lb)   SpO2 98%     Assessment/Plan  Problem List Items Addressed This Visit          Circulatory    Irregular heart rate     EKG - Wondering atrial pacemaker   ASA 81 mg by mouth daily   Client declined follow up at with cardiologist for further evaluation of irregular HR            Endocrine/Metabolic    Hypothyroidism     2023 TSH 3.8  Levothyroxine 25 mcg by mouth daily               Other    Skin ulcer of sacrum (CMS/HCC) - Primary     Wound care as ordered   Wound care NP services- per staff client declined sacral xray to rule out osteomyelitis   Acetaminophen 650 mg by mouth every 4 hrs as needed    Tramadol 50 mg by mouth every 8 hrs as needed  Add Norco 5/325 mg by mouth every 12 hrs as needed x 14 days               HPI: Staff report client signed a DNR-Arrest. Staff working with client to establish a POA. Client declined follow up apt with Cardiology. Client declined sacral Xray per recommendation of Wound Care NP, rule out osteomyelitis of sacrum- worsening chronic sacral decubitus. Added Norco as needed to pain medication regimen. Client denies HA, dizziness, or lightheadedness. Denies CP, SOB, Cough, or increased edema. RA. Denies abdominal pain, N/V, diarrhea, or constipation. Denies dysuria (Pérez). No reported significant weight loss. C/O sacral pain with repositioning and dressing changes.     Review of Systems ROS as described in in HPI     Physical Exam  Constitutional:       Appearance: Normal appearance.      Comments: Resting in bed   HENT:      Head: Normocephalic.      Mouth/Throat:      Mouth: Mucous membranes are moist.   Cardiovascular:      Rate and Rhythm: Normal rate. Rhythm irregular.    Pulmonary:      Effort: Pulmonary effort is normal.      Comments: RA  Abdominal:      General: Bowel sounds are normal.   Genitourinary:     Comments: Pérez with yellow urine   Musculoskeletal:      Cervical back: Neck supple.      Comments: Nonambulatory    Skin:     General: Skin is warm and dry.      Comments: Sacral decubitus-Managed by Wound Care NP   Neurological:      Mental Status: She is alert. Mental status is at baseline.   Psychiatric:         Mood and Affect: Mood normal.                   Electronically Signed By: DESTINY Barraza   6/14/23  8:21 AM

## 2023-06-14 VITALS
RESPIRATION RATE: 15 BRPM | TEMPERATURE: 97 F | DIASTOLIC BLOOD PRESSURE: 87 MMHG | WEIGHT: 138 LBS | OXYGEN SATURATION: 98 % | SYSTOLIC BLOOD PRESSURE: 130 MMHG | HEART RATE: 89 BPM

## 2023-06-14 NOTE — ASSESSMENT & PLAN NOTE
Wound care as ordered   Wound care NP services- per staff client declined sacral xray to rule out osteomyelitis   Acetaminophen 650 mg by mouth every 4 hrs as needed    Tramadol 50 mg by mouth every 8 hrs as needed  Add Norco 5/325 mg by mouth every 12 hrs as needed x 14 days

## 2023-06-14 NOTE — PROGRESS NOTES
Patient ID: Dixie Hansen is a 88 y.o. female who is long term resident being seen and evaluated for multiple medical problems.  [id number]   1935    /87   Pulse 89   Temp 36.1 °C (97 °F)   Resp 15   Wt 62.6 kg (138 lb)   SpO2 98%     Assessment/Plan  Problem List Items Addressed This Visit          Circulatory    Irregular heart rate     EKG - Wondering atrial pacemaker   ASA 81 mg by mouth daily   Client declined follow up at with cardiologist for further evaluation of irregular HR            Endocrine/Metabolic    Hypothyroidism     5/31/2023 TSH 3.8  Levothyroxine 25 mcg by mouth daily               Other    Skin ulcer of sacrum (CMS/HCC) - Primary     Wound care as ordered   Wound care NP services- per staff client declined sacral xray to rule out osteomyelitis   Acetaminophen 650 mg by mouth every 4 hrs as needed    Tramadol 50 mg by mouth every 8 hrs as needed  Add Norco 5/325 mg by mouth every 12 hrs as needed x 14 days               HPI: Staff report client signed a DNR-Arrest. Staff working with client to establish a POA. Client declined follow up apt with Cardiology. Client declined sacral Xray per recommendation of Wound Care NP, rule out osteomyelitis of sacrum- worsening chronic sacral decubitus. Added Norco as needed to pain medication regimen. Client denies HA, dizziness, or lightheadedness. Denies CP, SOB, Cough, or increased edema. RA. Denies abdominal pain, N/V, diarrhea, or constipation. Denies dysuria (Pérez). No reported significant weight loss. C/O sacral pain with repositioning and dressing changes.     Review of Systems ROS as described in in HPI     Physical Exam  Constitutional:       Appearance: Normal appearance.      Comments: Resting in bed   HENT:      Head: Normocephalic.      Mouth/Throat:      Mouth: Mucous membranes are moist.   Cardiovascular:      Rate and Rhythm: Normal rate. Rhythm irregular.   Pulmonary:      Effort: Pulmonary effort is normal.      Comments:  RA  Abdominal:      General: Bowel sounds are normal.   Genitourinary:     Comments: Pérez with yellow urine   Musculoskeletal:      Cervical back: Neck supple.      Comments: Nonambulatory    Skin:     General: Skin is warm and dry.      Comments: Sacral decubitus-Managed by Wound Care NP   Neurological:      Mental Status: She is alert. Mental status is at baseline.   Psychiatric:         Mood and Affect: Mood normal.

## 2023-06-14 NOTE — ASSESSMENT & PLAN NOTE
EKG - Wondering atrial pacemaker   ASA 81 mg by mouth daily   Client declined follow up at with cardiologist for further evaluation of irregular HR

## 2023-06-20 ENCOUNTER — NURSING HOME VISIT (OUTPATIENT)
Dept: POST ACUTE CARE | Facility: EXTERNAL LOCATION | Age: 88
End: 2023-06-20
Payer: MEDICARE

## 2023-06-20 DIAGNOSIS — F32.1 CURRENT MODERATE EPISODE OF MAJOR DEPRESSIVE DISORDER, UNSPECIFIED WHETHER RECURRENT (MULTI): Primary | ICD-10-CM

## 2023-06-20 DIAGNOSIS — R52 PAIN: ICD-10-CM

## 2023-06-20 PROCEDURE — 99308 SBSQ NF CARE LOW MDM 20: CPT | Performed by: NURSE PRACTITIONER

## 2023-06-20 NOTE — LETTER
Patient: Dixie Hansen  : 1935    Encounter Date: 2023    Patient ID: Dixie Hansen is a 88 y.o. female who is long term resident being seen and evaluated for multiple medical problems.  [id number]   1935    /74   Pulse 68   Temp 36.1 °C (97 °F)   Resp 15   Wt 62.6 kg (138 lb)   SpO2 96%     Assessment/Plan  Problem List Items Addressed This Visit          Nervous    Pain     Sacral decubitus site   DC Tramadol   Norco 5/325 mg by mouth BID             Other    Current moderate episode of major depressive disorder (CMS/HCC) - Primary     Venlafaxine 37.5 mg by mouth daily                HPI: Staff report client has increasing pain to sacral area, site of decubitus. Receives wound care services. Client denies HA, dizziness, or lightheadedness. Denies CP, SOB, Cough, or increased edema. RA. Denies abdominal pain, N/V, diarrhea, or constipation. Denies dysuria, Pérez. Denies change in appetite. Denies insomnia. Denies current pain. C/O pain with dressing changes and repositioning.      Review of Systems ROS as described in in HPI     Physical Exam  Constitutional:       Appearance: Normal appearance.      Comments: Resting in bed    HENT:      Head: Normocephalic.      Mouth/Throat:      Mouth: Mucous membranes are moist.   Cardiovascular:      Rate and Rhythm: Normal rate. Rhythm irregular.   Pulmonary:      Effort: Pulmonary effort is normal.      Breath sounds: Normal breath sounds.      Comments: RA  Abdominal:      General: Bowel sounds are normal.   Genitourinary:     Comments: Pérez with yellow urine   Musculoskeletal:      Cervical back: Neck supple.      Comments: Nonambulatory    Skin:     General: Skin is warm and dry.      Comments: Sacral decubitus- wound care services    Neurological:      Mental Status: She is alert. Mental status is at baseline.   Psychiatric:         Mood and Affect: Mood normal.                   Electronically Signed By: DESTINY Barraza   23  9:05  AM

## 2023-06-21 VITALS
RESPIRATION RATE: 15 BRPM | OXYGEN SATURATION: 96 % | DIASTOLIC BLOOD PRESSURE: 74 MMHG | TEMPERATURE: 97 F | WEIGHT: 138 LBS | SYSTOLIC BLOOD PRESSURE: 130 MMHG | HEART RATE: 68 BPM

## 2023-06-21 PROBLEM — R52 PAIN: Status: ACTIVE | Noted: 2023-06-21

## 2023-06-21 NOTE — PROGRESS NOTES
Patient ID: Dixie Hansen is a 88 y.o. female who is long term resident being seen and evaluated for multiple medical problems.  [id number]   1935    /74   Pulse 68   Temp 36.1 °C (97 °F)   Resp 15   Wt 62.6 kg (138 lb)   SpO2 96%     Assessment/Plan  Problem List Items Addressed This Visit          Nervous    Pain     Sacral decubitus site   DC Tramadol   Norco 5/325 mg by mouth BID             Other    Current moderate episode of major depressive disorder (CMS/HCC) - Primary     Venlafaxine 37.5 mg by mouth daily                HPI: Staff report client has increasing pain to sacral area, site of decubitus. Receives wound care services. Client denies HA, dizziness, or lightheadedness. Denies CP, SOB, Cough, or increased edema. RA. Denies abdominal pain, N/V, diarrhea, or constipation. Denies dysuria, Pérez. Denies change in appetite. Denies insomnia. Denies current pain. C/O pain with dressing changes and repositioning.      Review of Systems ROS as described in in HPI     Physical Exam  Constitutional:       Appearance: Normal appearance.      Comments: Resting in bed    HENT:      Head: Normocephalic.      Mouth/Throat:      Mouth: Mucous membranes are moist.   Cardiovascular:      Rate and Rhythm: Normal rate. Rhythm irregular.   Pulmonary:      Effort: Pulmonary effort is normal.      Breath sounds: Normal breath sounds.      Comments: RA  Abdominal:      General: Bowel sounds are normal.   Genitourinary:     Comments: Pérez with yellow urine   Musculoskeletal:      Cervical back: Neck supple.      Comments: Nonambulatory    Skin:     General: Skin is warm and dry.      Comments: Sacral decubitus- wound care services    Neurological:      Mental Status: She is alert. Mental status is at baseline.   Psychiatric:         Mood and Affect: Mood normal.

## 2023-07-04 ENCOUNTER — NURSING HOME VISIT (OUTPATIENT)
Dept: POST ACUTE CARE | Facility: EXTERNAL LOCATION | Age: 88
End: 2023-07-04
Payer: MEDICARE

## 2023-07-04 DIAGNOSIS — R63.4 WEIGHT LOSS: ICD-10-CM

## 2023-07-04 DIAGNOSIS — K21.9 GASTROESOPHAGEAL REFLUX DISEASE WITHOUT ESOPHAGITIS: ICD-10-CM

## 2023-07-04 DIAGNOSIS — I10 PRIMARY HYPERTENSION: Primary | ICD-10-CM

## 2023-07-04 DIAGNOSIS — L98.429 SKIN ULCER OF SACRUM, UNSPECIFIED ULCER STAGE (MULTI): ICD-10-CM

## 2023-07-04 PROCEDURE — 99308 SBSQ NF CARE LOW MDM 20: CPT | Performed by: NURSE PRACTITIONER

## 2023-07-04 NOTE — LETTER
Patient: Dixie Hansen  : 1935    Encounter Date: 2023    Patient ID: Dixie Hansen is a 88 y.o. female who is long term resident being seen and evaluated for multiple medical problems.  [id number]   1935    /70   Pulse 72   Temp 36.7 °C (98 °F)   Resp 15   Wt 62.6 kg (138 lb)   SpO2 97%     Assessment/Plan  Problem List Items Addressed This Visit          Cardiac and Vasculature    Primary hypertension - Primary     Atenolol 25 mg by mouth daily             Endocrine/Metabolic    Weight loss     Food snacks  House supplement            Gastrointestinal and Abdominal    Gastroesophageal reflux disease without esophagitis     Pepcid 20 mg by mouth every HS              Musculoskeletal and Injuries    Skin ulcer of sacrum (CMS/[facility])     Wound care as ordered   Wound care NP services- per staff client declined sacral xray to rule out osteomyelitis   Acetaminophen 650 mg by mouth every 4 hrs as needed    Norco 5/325 mg by mouth every 12 hrs               HPI: Client continues to receive wound care services, sacral decubitus. Client denies HA, dizziness, or lightheadedness. Denies CP, SOB, Cough, or increased edema. RA. Denies abdominal pain, N/V, diarrhea, or constipation. Denies dysuria (Pérez). States appetite improving. Denies change in appetite. C/O pain to lumbar and buttock.     Review of Systems ROS as described in in HPI     Physical Exam  Constitutional:       Comments: Resting in bed    HENT:      Head: Normocephalic.      Mouth/Throat:      Mouth: Mucous membranes are moist.   Cardiovascular:      Rate and Rhythm: Normal rate. Rhythm irregular.   Pulmonary:      Effort: Pulmonary effort is normal.      Breath sounds: Normal breath sounds.   Abdominal:      General: Bowel sounds are normal.   Genitourinary:     Comments: Pérez with yellow urine  Musculoskeletal:      Cervical back: Neck supple.      Comments: Nonambulatory    Skin:     General: Skin is warm and dry.      Comments: Heel  protectors    Neurological:      Mental Status: She is alert. Mental status is at baseline.   Psychiatric:         Mood and Affect: Mood normal.                   Electronically Signed By: DESTINY Barraza   7/6/23  7:41 AM

## 2023-07-06 VITALS
SYSTOLIC BLOOD PRESSURE: 120 MMHG | WEIGHT: 138 LBS | RESPIRATION RATE: 15 BRPM | TEMPERATURE: 98 F | OXYGEN SATURATION: 97 % | HEART RATE: 72 BPM | DIASTOLIC BLOOD PRESSURE: 70 MMHG

## 2023-07-06 NOTE — ASSESSMENT & PLAN NOTE
Wound care as ordered   Wound care NP services- per staff client declined sacral xray to rule out osteomyelitis   Acetaminophen 650 mg by mouth every 4 hrs as needed    Norco 5/325 mg by mouth every 12 hrs

## 2023-07-06 NOTE — PROGRESS NOTES
Patient ID: Dixie Hansen is a 88 y.o. female who is long term resident being seen and evaluated for multiple medical problems.  [id number]   1935    /70   Pulse 72   Temp 36.7 °C (98 °F)   Resp 15   Wt 62.6 kg (138 lb)   SpO2 97%     Assessment/Plan  Problem List Items Addressed This Visit          Cardiac and Vasculature    Primary hypertension - Primary     Atenolol 25 mg by mouth daily             Endocrine/Metabolic    Weight loss     Food snacks  House supplement            Gastrointestinal and Abdominal    Gastroesophageal reflux disease without esophagitis     Pepcid 20 mg by mouth every HS              Musculoskeletal and Injuries    Skin ulcer of sacrum (CMS/HCC)     Wound care as ordered   Wound care NP services- per staff client declined sacral xray to rule out osteomyelitis   Acetaminophen 650 mg by mouth every 4 hrs as needed    Norco 5/325 mg by mouth every 12 hrs               HPI: Client continues to receive wound care services, sacral decubitus. Client denies HA, dizziness, or lightheadedness. Denies CP, SOB, Cough, or increased edema. RA. Denies abdominal pain, N/V, diarrhea, or constipation. Denies dysuria (Pérez). States appetite improving. Denies change in appetite. C/O pain to lumbar and buttock.     Review of Systems ROS as described in in HPI     Physical Exam  Constitutional:       Comments: Resting in bed    HENT:      Head: Normocephalic.      Mouth/Throat:      Mouth: Mucous membranes are moist.   Cardiovascular:      Rate and Rhythm: Normal rate. Rhythm irregular.   Pulmonary:      Effort: Pulmonary effort is normal.      Breath sounds: Normal breath sounds.   Abdominal:      General: Bowel sounds are normal.   Genitourinary:     Comments: Pérez with yellow urine  Musculoskeletal:      Cervical back: Neck supple.      Comments: Nonambulatory    Skin:     General: Skin is warm and dry.      Comments: Heel protectors    Neurological:      Mental Status: She is alert. Mental  status is at baseline.   Psychiatric:         Mood and Affect: Mood normal.

## 2023-08-15 ENCOUNTER — NURSING HOME VISIT (OUTPATIENT)
Dept: POST ACUTE CARE | Facility: EXTERNAL LOCATION | Age: 88
End: 2023-08-15
Payer: MEDICARE

## 2023-08-15 DIAGNOSIS — I10 PRIMARY HYPERTENSION: ICD-10-CM

## 2023-08-15 DIAGNOSIS — E03.9 HYPOTHYROIDISM, UNSPECIFIED TYPE: ICD-10-CM

## 2023-08-15 DIAGNOSIS — F51.01 PRIMARY INSOMNIA: ICD-10-CM

## 2023-08-15 DIAGNOSIS — J30.9 ALLERGIC RHINITIS, UNSPECIFIED SEASONALITY, UNSPECIFIED TRIGGER: ICD-10-CM

## 2023-08-15 DIAGNOSIS — D50.9 IRON DEFICIENCY ANEMIA, UNSPECIFIED IRON DEFICIENCY ANEMIA TYPE: ICD-10-CM

## 2023-08-15 DIAGNOSIS — R19.5 LOOSE STOOLS: ICD-10-CM

## 2023-08-15 DIAGNOSIS — E87.6 HYPOKALEMIA: Primary | ICD-10-CM

## 2023-08-15 DIAGNOSIS — L98.429 SKIN ULCER OF SACRUM, UNSPECIFIED ULCER STAGE (MULTI): ICD-10-CM

## 2023-08-15 DIAGNOSIS — F32.1 CURRENT MODERATE EPISODE OF MAJOR DEPRESSIVE DISORDER, UNSPECIFIED WHETHER RECURRENT (MULTI): ICD-10-CM

## 2023-08-15 PROCEDURE — 99309 SBSQ NF CARE MODERATE MDM 30: CPT | Performed by: NURSE PRACTITIONER

## 2023-08-15 NOTE — LETTER
Patient: Dixie Hansen  : 1935    Encounter Date: 08/15/2023    Patient ID: Dixie Hansen is a 88 y.o. female who is long term resident being seen and evaluated for multiple medical problems.  [id number]   1935    /72   Pulse 80   Temp 36.7 °C (98 °F)   Resp 15   Wt 62.6 kg (138 lb)   SpO2 97%     Assessment/Plan  Problem List Items Addressed This Visit       Primary insomnia     Melatonin 5 mg by mouth every HS as needed          Primary hypertension     Atenolol 25 mg by mouth daily    ASA 81 mg by mouth daily          Hypokalemia - Primary     Potassium 2023- 4.3  Potasssium 20 meq by mouth daily          Iron deficiency anemia     Ferrous sulfate 325 mg by mouth daily   Folic acid 1 mg by mouth daily (Anemia)          Allergic rhinitis     Loratadine 10 mg by mouth daily    Fluticasone 50 mcg one spray each nostril every 12 hrs as needed          Skin ulcer of sacrum (CMS/HCC)     Wound care as ordered   Wound care NP services  Acetaminophen 650 mg by mouth every 4 hrs as needed    Norco 5/325 mg by mouth every 12 hrs         Hypothyroidism     2023 TSH 3.8  Levothyroxine 25 mcg by mouth daily             Current moderate episode of major depressive disorder (CMS/HCC)     Venlafaxine 37.5 mg by mouth daily     Seroquel 12.5 mg by mouth BID          Loose stools     Metamucil 2 tablets by mouth daily as prescribed   Probiotic one capsule by mouth daily               HPI: Client denies HA, dizziness, or lightheadedness. Denies CP, SOB, Cough, or increased edema. RA. Denies abdominal pain, N/V, current diarrhea, or constipation. Denies dysuria (Pérez). States appetite improving. Denies insomnia. C/O arthritic pain to bilateral hands.     Review of Systems ROS as described in in HPI     Physical Exam  Constitutional:       Comments: Resting in bed    HENT:      Mouth/Throat:      Mouth: Mucous membranes are moist.   Cardiovascular:      Rate and Rhythm: Normal rate.      Comments:  Murmur  Occasioanal premature beat   Pulmonary:      Effort: Pulmonary effort is normal.      Breath sounds: Normal breath sounds.      Comments: RA  Abdominal:      General: Bowel sounds are normal.   Genitourinary:     Comments: Pérez with yellow urine   Musculoskeletal:      Cervical back: Neck supple.      Comments: Nonambulatory   No leg edema    Skin:     General: Skin is warm and dry.      Comments: Sacral wound care  Bilateral heel protectors    Neurological:      Mental Status: She is alert. Mental status is at baseline.   Psychiatric:         Mood and Affect: Mood normal.      Comments: Conversational                    Electronically Signed By: DESTINY Barraza   8/16/23 11:55 AM

## 2023-08-16 VITALS
SYSTOLIC BLOOD PRESSURE: 128 MMHG | OXYGEN SATURATION: 97 % | TEMPERATURE: 98 F | WEIGHT: 138 LBS | DIASTOLIC BLOOD PRESSURE: 72 MMHG | RESPIRATION RATE: 15 BRPM | HEART RATE: 80 BPM

## 2023-08-16 PROBLEM — D64.9 ABSOLUTE ANEMIA: Status: ACTIVE | Noted: 2023-08-16

## 2023-08-16 PROBLEM — R05.1 ACUTE COUGH: Status: RESOLVED | Noted: 2023-05-10 | Resolved: 2023-08-16

## 2023-08-16 PROBLEM — I49.8 ATRIAL ARRHYTHMIA: Status: ACTIVE | Noted: 2023-08-16

## 2023-08-16 PROBLEM — I70.90 ATHEROSCLEROSIS: Status: ACTIVE | Noted: 2023-08-16

## 2023-08-16 PROBLEM — E78.5 HYPERLIPIDEMIA: Status: ACTIVE | Noted: 2023-08-16

## 2023-08-16 PROBLEM — R19.5 LOOSE STOOLS: Status: ACTIVE | Noted: 2023-08-16

## 2023-08-16 PROBLEM — M19.90 OSTEOARTHRITIS: Status: ACTIVE | Noted: 2023-08-16

## 2023-08-16 PROBLEM — M17.11 OSTEOARTHRITIS OF RIGHT KNEE: Status: ACTIVE | Noted: 2023-08-16

## 2023-08-16 PROBLEM — L40.50 PSORIATIC ARTHRITIS (MULTI): Status: ACTIVE | Noted: 2023-08-16

## 2023-08-16 PROBLEM — M47.816 OSTEOARTHRITIS OF LUMBAR SPINE: Status: ACTIVE | Noted: 2023-08-16

## 2023-08-16 PROBLEM — L40.9 PSORIASIS: Status: ACTIVE | Noted: 2023-08-16

## 2023-08-16 NOTE — ASSESSMENT & PLAN NOTE
Wound care as ordered   Wound care NP services  Acetaminophen 650 mg by mouth every 4 hrs as needed    Norco 5/325 mg by mouth every 12 hrs

## 2023-08-16 NOTE — ASSESSMENT & PLAN NOTE
Loratadine 10 mg by mouth daily    Fluticasone 50 mcg one spray each nostril every 12 hrs as needed

## 2023-08-16 NOTE — PROGRESS NOTES
Patient ID: Dixie Hansen is a 88 y.o. female who is long term resident being seen and evaluated for multiple medical problems.  [id number]   1935    /72   Pulse 80   Temp 36.7 °C (98 °F)   Resp 15   Wt 62.6 kg (138 lb)   SpO2 97%     Assessment/Plan  Problem List Items Addressed This Visit       Primary insomnia     Melatonin 5 mg by mouth every HS as needed          Primary hypertension     Atenolol 25 mg by mouth daily    ASA 81 mg by mouth daily          Hypokalemia - Primary     Potassium 7/6/2023- 4.3  Potasssium 20 meq by mouth daily          Iron deficiency anemia     Ferrous sulfate 325 mg by mouth daily   Folic acid 1 mg by mouth daily (Anemia)          Allergic rhinitis     Loratadine 10 mg by mouth daily    Fluticasone 50 mcg one spray each nostril every 12 hrs as needed          Skin ulcer of sacrum (CMS/[facility])     Wound care as ordered   Wound care NP services  Acetaminophen 650 mg by mouth every 4 hrs as needed    Norco 5/325 mg by mouth every 12 hrs         Hypothyroidism     5/31/2023 TSH 3.8  Levothyroxine 25 mcg by mouth daily             Current moderate episode of major depressive disorder (CMS/HCC)     Venlafaxine 37.5 mg by mouth daily     Seroquel 12.5 mg by mouth BID          Loose stools     Metamucil 2 tablets by mouth daily as prescribed   Probiotic one capsule by mouth daily               HPI: Client denies HA, dizziness, or lightheadedness. Denies CP, SOB, Cough, or increased edema. RA. Denies abdominal pain, N/V, current diarrhea, or constipation. Denies dysuria (Pérez). States appetite improving. Denies insomnia. C/O arthritic pain to bilateral hands.     Review of Systems ROS as described in in HPI     Physical Exam  Constitutional:       Comments: Resting in bed    HENT:      Mouth/Throat:      Mouth: Mucous membranes are moist.   Cardiovascular:      Rate and Rhythm: Normal rate.      Comments: Murmur  Occasioanal premature beat   Pulmonary:      Effort: Pulmonary effort  is normal.      Breath sounds: Normal breath sounds.      Comments: RA  Abdominal:      General: Bowel sounds are normal.   Genitourinary:     Comments: Pérez with yellow urine   Musculoskeletal:      Cervical back: Neck supple.      Comments: Nonambulatory   No leg edema    Skin:     General: Skin is warm and dry.      Comments: Sacral wound care  Bilateral heel protectors    Neurological:      Mental Status: She is alert. Mental status is at baseline.   Psychiatric:         Mood and Affect: Mood normal.      Comments: Conversational

## 2023-09-19 ENCOUNTER — NURSING HOME VISIT (OUTPATIENT)
Dept: POST ACUTE CARE | Facility: EXTERNAL LOCATION | Age: 88
End: 2023-09-19
Payer: MEDICARE

## 2023-09-19 DIAGNOSIS — E87.6 HYPOKALEMIA: ICD-10-CM

## 2023-09-19 DIAGNOSIS — L98.429 SKIN ULCER OF SACRUM, UNSPECIFIED ULCER STAGE (MULTI): ICD-10-CM

## 2023-09-19 DIAGNOSIS — K21.9 GASTROESOPHAGEAL REFLUX DISEASE WITHOUT ESOPHAGITIS: Primary | ICD-10-CM

## 2023-09-19 PROCEDURE — 99308 SBSQ NF CARE LOW MDM 20: CPT | Performed by: NURSE PRACTITIONER

## 2023-09-19 NOTE — LETTER
Patient: Dixie Hansen  : 1935    Encounter Date: 2023    Patient ID: Dixie Hansen is a 88 y.o. female who is long term resident being seen and evaluated for multiple medical problems.  [id number]   1935    /61   Pulse 74   Temp 36.7 °C (98 °F)   Resp 15   Wt 63 kg (139 lb)   SpO2 97%   BMI 24.62 kg/m²     Assessment/Plan  Problem List Items Addressed This Visit       Hypokalemia     Potassium 2023- 4.3  Potasssium 20 meq by mouth daily         Gastroesophageal reflux disease without esophagitis - Primary     Pepcid 20 mg by mouth every HS            Skin ulcer of sacrum (CMS/[facility])     Wound care as ordered   Wound care NP services  Acetaminophen 650 mg by mouth every 4 hrs as needed    Increase Norco 5/325 mg by mouth to TID scheduled               HPI: Staff report client C/O increasing pain to sacral decubitus area and lumbar pain. Spoke with client, requests increase in pain medication. Client denies HA, dizziness, or lightheadedness. Denies CP, SOB, or Cough. RA.  Denies abdominal pain or N/V. Denies dysuria (Pérez). Denies change in appetite. No significant weight change. C/O pain to buttock, worse with dressing changes.     Review of Systems ROS as described in in HPI     Physical Exam  Constitutional:       Comments: Resting in bed    HENT:      Head: Normocephalic.      Mouth/Throat:      Mouth: Mucous membranes are moist.   Cardiovascular:      Rate and Rhythm: Normal rate.      Comments: Premature beats   Pulmonary:      Effort: Pulmonary effort is normal.      Breath sounds: Normal breath sounds.      Comments: RA  Abdominal:      General: Bowel sounds are normal.   Genitourinary:     Comments: Pérez with dark yellow urine, clear   Musculoskeletal:      Cervical back: Neck supple.      Comments: Nonambulatory   Heel protectors    Skin:     General: Skin is warm.      Comments: Wound care team- Sacral decubitus    Neurological:      Mental Status: She is alert. Mental status  is at baseline.   Psychiatric:         Mood and Affect: Mood normal.      Comments: Conversational                    Electronically Signed By: DESTINY Barraza   9/20/23 10:12 AM

## 2023-09-20 VITALS
HEART RATE: 74 BPM | DIASTOLIC BLOOD PRESSURE: 61 MMHG | OXYGEN SATURATION: 97 % | RESPIRATION RATE: 15 BRPM | SYSTOLIC BLOOD PRESSURE: 126 MMHG | WEIGHT: 139 LBS | BODY MASS INDEX: 24.62 KG/M2 | TEMPERATURE: 98 F

## 2023-09-20 PROBLEM — L89.159 PRESSURE SORE ON SACRUM: Status: ACTIVE | Noted: 2023-09-20

## 2023-09-20 PROBLEM — E44.0 MODERATE PROTEIN-CALORIE MALNUTRITION (MULTI): Status: ACTIVE | Noted: 2023-09-20

## 2023-09-20 NOTE — ASSESSMENT & PLAN NOTE
Wound care as ordered   Wound care NP services  Acetaminophen 650 mg by mouth every 4 hrs as needed    Increase Norco 5/325 mg by mouth to TID scheduled

## 2023-09-20 NOTE — PROGRESS NOTES
Patient ID: Dixie Hansen is a 88 y.o. female who is long term resident being seen and evaluated for multiple medical problems.  [id number]   1935    /61   Pulse 74   Temp 36.7 °C (98 °F)   Resp 15   Wt 63 kg (139 lb)   SpO2 97%   BMI 24.62 kg/m²     Assessment/Plan  Problem List Items Addressed This Visit       Hypokalemia     Potassium 7/6/2023- 4.3  Potasssium 20 meq by mouth daily         Gastroesophageal reflux disease without esophagitis - Primary     Pepcid 20 mg by mouth every HS            Skin ulcer of sacrum (CMS/HCC)     Wound care as ordered   Wound care NP services  Acetaminophen 650 mg by mouth every 4 hrs as needed    Increase Norco 5/325 mg by mouth to TID scheduled               HPI: Staff report client C/O increasing pain to sacral decubitus area and lumbar pain. Spoke with client, requests increase in pain medication. Client denies HA, dizziness, or lightheadedness. Denies CP, SOB, or Cough. RA.  Denies abdominal pain or N/V. Denies dysuria (Pérez). Denies change in appetite. No significant weight change. C/O pain to buttock, worse with dressing changes.     Review of Systems ROS as described in in HPI     Physical Exam  Constitutional:       Comments: Resting in bed    HENT:      Head: Normocephalic.      Mouth/Throat:      Mouth: Mucous membranes are moist.   Cardiovascular:      Rate and Rhythm: Normal rate.      Comments: Premature beats   Pulmonary:      Effort: Pulmonary effort is normal.      Breath sounds: Normal breath sounds.      Comments: RA  Abdominal:      General: Bowel sounds are normal.   Genitourinary:     Comments: Pérez with dark yellow urine, clear   Musculoskeletal:      Cervical back: Neck supple.      Comments: Nonambulatory   Heel protectors    Skin:     General: Skin is warm.      Comments: Wound care team- Sacral decubitus    Neurological:      Mental Status: She is alert. Mental status is at baseline.   Psychiatric:         Mood and Affect: Mood normal.       Comments: Conversational

## 2023-10-24 ENCOUNTER — NURSING HOME VISIT (OUTPATIENT)
Dept: POST ACUTE CARE | Facility: EXTERNAL LOCATION | Age: 88
End: 2023-10-24
Payer: MEDICARE

## 2023-10-24 DIAGNOSIS — R52 PAIN: ICD-10-CM

## 2023-10-24 DIAGNOSIS — F41.9 ANXIETY: ICD-10-CM

## 2023-10-24 DIAGNOSIS — Z51.5 HOSPICE CARE PATIENT: Primary | ICD-10-CM

## 2023-10-24 DIAGNOSIS — R68.89 EXCESSIVE ORAL SECRETIONS: ICD-10-CM

## 2023-10-24 PROCEDURE — 99308 SBSQ NF CARE LOW MDM 20: CPT | Performed by: NURSE PRACTITIONER

## 2023-10-24 NOTE — LETTER
Patient: Dixie Hansen  : 1935    Encounter Date: 10/24/2023    Patient ID: Dixie Hansen is a 88 y.o. female who is long term resident being seen and evaluated for multiple medical problems.  [id number]   1935    /80   Pulse 78   Temp 36.6 °C (97.9 °F)   Resp 15   Wt 63.5 kg (140 lb)   SpO2 93%   BMI 24.80 kg/m²     Assessment/Plan  Problem List Items Addressed This Visit       Anxiety     Ativan 1 mg by mouth every 4 hrs as needed          Pain     Sacral decubitus site   Norco 5/325 mg by mouth TID  Morphine 10 mg by mouth every 1 hrs as needed          Hospice care patient - Primary     [facility] 10/23/2023         Excessive oral secretions     Levsin 0.125 mg by mouth every 6 hrs as needed               HPI: Client now receives [facility] care Hosp[facility] services, 10/23/2023. Medication adjustments made. Client appears comfortable at this time. No current signs of heightened anxiety, pain, or tachypnea.    Review of Systems No participation in ROS    Physical Exam  Constitutional:       Comments: Resting quietly with eyes closed    HENT:      Mouth/Throat:      Mouth: Mucous membranes are moist.   Cardiovascular:      Rate and Rhythm: Normal rate.   Pulmonary:      Effort: Pulmonary effort is normal.      Comments: RA  Abdominal:      General: Bowel sounds are normal.   Genitourinary:     Comments: Pérez with concentrated yellow urine   Musculoskeletal:      Comments: Heel protectors   No leg edema    Skin:     General: Skin is dry.   Neurological:      Mental Status: She is alert.      Comments: Resting quietly    Psychiatric:      Comments: No current heightened anxiety                    Electronically Signed By: DESTINY Barraza   10/25/23  6:58 AM

## 2023-10-25 VITALS
HEART RATE: 78 BPM | BODY MASS INDEX: 24.8 KG/M2 | RESPIRATION RATE: 15 BRPM | OXYGEN SATURATION: 93 % | DIASTOLIC BLOOD PRESSURE: 80 MMHG | TEMPERATURE: 97.9 F | WEIGHT: 140 LBS | SYSTOLIC BLOOD PRESSURE: 110 MMHG

## 2023-10-25 PROBLEM — Z51.5 HOSPICE CARE PATIENT: Status: ACTIVE | Noted: 2023-10-25

## 2023-10-25 PROBLEM — R68.89 EXCESSIVE ORAL SECRETIONS: Status: ACTIVE | Noted: 2023-10-25

## 2023-10-25 NOTE — ASSESSMENT & PLAN NOTE
Sacral decubitus site   Norco 5/325 mg by mouth TID  Morphine 10 mg by mouth every 1 hrs as needed